# Patient Record
Sex: FEMALE | Race: WHITE | NOT HISPANIC OR LATINO | Employment: FULL TIME | URBAN - METROPOLITAN AREA
[De-identification: names, ages, dates, MRNs, and addresses within clinical notes are randomized per-mention and may not be internally consistent; named-entity substitution may affect disease eponyms.]

---

## 2019-05-30 ENCOUNTER — OFFICE VISIT (OUTPATIENT)
Dept: URGENT CARE | Facility: MEDICAL CENTER | Age: 29
End: 2019-05-30
Payer: COMMERCIAL

## 2019-05-30 VITALS
DIASTOLIC BLOOD PRESSURE: 70 MMHG | SYSTOLIC BLOOD PRESSURE: 110 MMHG | OXYGEN SATURATION: 92 % | WEIGHT: 172 LBS | TEMPERATURE: 98 F | HEIGHT: 68 IN | HEART RATE: 63 BPM | BODY MASS INDEX: 26.07 KG/M2

## 2019-05-30 DIAGNOSIS — N94.6 PAINFUL MENSTRUATION: ICD-10-CM

## 2019-05-30 LAB
APPEARANCE UR: CLEAR
BILIRUB UR STRIP-MCNC: NEGATIVE MG/DL
COLOR UR AUTO: YELLOW
GLUCOSE UR STRIP.AUTO-MCNC: NEGATIVE MG/DL
KETONES UR STRIP.AUTO-MCNC: NEGATIVE MG/DL
LEUKOCYTE ESTERASE UR QL STRIP.AUTO: NEGATIVE
NITRITE UR QL STRIP.AUTO: NEGATIVE
PH UR STRIP.AUTO: 5.5 [PH] (ref 5–8)
PROT UR QL STRIP: NEGATIVE MG/DL
RBC UR QL AUTO: NORMAL
SP GR UR STRIP.AUTO: >=1.03
UROBILINOGEN UR STRIP-MCNC: 0.2 MG/DL

## 2019-05-30 PROCEDURE — 81002 URINALYSIS NONAUTO W/O SCOPE: CPT | Performed by: NURSE PRACTITIONER

## 2019-05-30 PROCEDURE — 99203 OFFICE O/P NEW LOW 30 MIN: CPT | Performed by: NURSE PRACTITIONER

## 2019-05-30 RX ORDER — METRONIDAZOLE 7.5 MG/G
GEL VAGINAL
Refills: 0 | COMMUNITY
Start: 2019-04-16 | End: 2021-07-19

## 2019-05-30 RX ORDER — COPPER 313.4 MG/1
INTRAUTERINE DEVICE INTRAUTERINE
COMMUNITY

## 2019-05-30 NOTE — PROGRESS NOTES
Subjective:      Terrell Vazquez is a 29 y.o. female who presents with Tampon Removal (she thinks there may be one stuck inside her since the weekend)    History reviewed. No pertinent past medical history.  Social History     Social History   • Marital status: Single     Spouse name: N/A   • Number of children: N/A   • Years of education: N/A     Occupational History   • Not on file.     Social History Main Topics   • Smoking status: Never Smoker   • Smokeless tobacco: Never Used   • Alcohol use Yes   • Drug use: No   • Sexual activity: Not on file     Other Topics Concern   • Not on file     Social History Narrative   • No narrative on file     History reviewed. No pertinent family history.    Allergies: Patient has no allergy information on record.    Patient is a 29-year-old female who presents with concern for possible vaginal foreign body.  States that she has a history of being treated for bacterial vaginosis recently with MetroGel vaginal.  States she believes she might have had a tampon in place and did not recall this and inserted the applicator to apply medication.  She thinks this may have pushed her tampon in far enough where she cannot get it out.  Patient states she does feel a sense of pressure, and states that when she tried to insert the applicator last night to apply medication that she felt the applicator did not go and is easily.  Because of that, patient thought she might have a tampon in place obstructing.          Other   This is a new problem. The current episode started in the past 7 days. The problem occurs constantly. The problem has been unchanged. Nothing aggravates the symptoms. She has tried nothing for the symptoms. The treatment provided no relief.       Review of Systems   Genitourinary:        Possible vaginal FB   All other systems reviewed and are negative.         Objective:     There were no vitals taken for this visit.     Physical Exam   Constitutional: She appears  well-developed and well-nourished.   Genitourinary: Vaginal discharge found.   Genitourinary Comments: Speculum exam of the vaginal canal does not reveal foreign body.  Upon opening the speculum, I can see clearly back to the cervix and checked around the cervix as well.  There is an IUD protruding from the cervical loss and appears to be in place.  There is some fresh blood noted in the cervical loss.  No other abnormalities, lacerations, masses, or foreign bodies noted.  Patient was advised as such.   Skin: Skin is warm and dry. Capillary refill takes less than 2 seconds.   Psychiatric: She has a normal mood and affect. Her behavior is normal. Judgment and thought content normal.   Vitals reviewed.    UA: negative leukocytes, negative nitrates, positive blood          Assessment/Plan:   Possible vaginal foreign body-ruled out    Patient reassured  May follow up with GYN if needed.   Follow up otherwise in UC for any further questions or concerns.      There are no diagnoses linked to this encounter.

## 2020-05-08 ENCOUNTER — HOSPITAL ENCOUNTER (OUTPATIENT)
Dept: RADIOLOGY | Facility: MEDICAL CENTER | Age: 30
End: 2020-05-08
Attending: OBSTETRICS & GYNECOLOGY
Payer: COMMERCIAL

## 2020-05-08 DIAGNOSIS — Z32.01 PREGNANCY EXAMINATION OR TEST, POSITIVE RESULT: ICD-10-CM

## 2020-05-08 DIAGNOSIS — O20.0 THREATENED MISCARRIAGE: ICD-10-CM

## 2020-05-08 DIAGNOSIS — N92.6 MISSED PERIODS: ICD-10-CM

## 2020-05-08 DIAGNOSIS — R10.2 PELVIC PAIN: ICD-10-CM

## 2020-05-08 PROCEDURE — 76817 TRANSVAGINAL US OBSTETRIC: CPT

## 2020-05-10 ENCOUNTER — HOSPITAL ENCOUNTER (EMERGENCY)
Facility: MEDICAL CENTER | Age: 30
End: 2020-05-10
Payer: COMMERCIAL

## 2021-04-28 ENCOUNTER — HOSPITAL ENCOUNTER (OUTPATIENT)
Dept: HOSPITAL 8 - RAD | Age: 31
Discharge: HOME | End: 2021-04-28
Attending: FAMILY MEDICINE
Payer: COMMERCIAL

## 2021-04-28 DIAGNOSIS — M79.604: Primary | ICD-10-CM

## 2021-07-19 ENCOUNTER — OFFICE VISIT (OUTPATIENT)
Dept: URGENT CARE | Facility: CLINIC | Age: 31
End: 2021-07-19
Payer: COMMERCIAL

## 2021-07-19 VITALS
WEIGHT: 160 LBS | OXYGEN SATURATION: 97 % | BODY MASS INDEX: 24.25 KG/M2 | HEIGHT: 68 IN | RESPIRATION RATE: 16 BRPM | TEMPERATURE: 98.7 F | SYSTOLIC BLOOD PRESSURE: 104 MMHG | DIASTOLIC BLOOD PRESSURE: 70 MMHG | HEART RATE: 93 BPM

## 2021-07-19 DIAGNOSIS — Z20.822 CLOSE EXPOSURE TO COVID-19 VIRUS: ICD-10-CM

## 2021-07-19 PROCEDURE — 99212 OFFICE O/P EST SF 10 MIN: CPT | Mod: CS | Performed by: FAMILY MEDICINE

## 2021-07-20 NOTE — PROGRESS NOTES
"Subjective:     Terrell Vazquez is a 31 y.o. female who presents for Coronavirus Screening (Exposure @ home )    HPI  Pt presents for evaluation of an acute problem  Patient with COVID-19 exposure at home  Patient currently completely asymptomatic  Wants to discuss being tested    Review of Systems   Constitutional: Negative for fever.   HENT: Negative for sore throat.    Respiratory: Negative for cough.    Gastrointestinal: Negative for vomiting.   Skin: Negative for rash.     PMH:  has no past medical history on file.  MEDS:   Current Outpatient Medications:   •  PARAGARD INTRAUTERINE COPPER IUD, by Intrauterine route., Disp: , Rfl:   ALLERGIES: No Known Allergies  SURGHX: History reviewed. No pertinent surgical history.  SOCHX:  reports that she has never smoked. She has never used smokeless tobacco. She reports current alcohol use. She reports current drug use. Drug: Marijuana.     Objective:   /70   Pulse 93   Temp 37.1 °C (98.7 °F) (Temporal)   Resp 16   Ht 1.727 m (5' 8\")   Wt 72.6 kg (160 lb)   SpO2 97%   Breastfeeding No   BMI 24.33 kg/m²     Physical Exam  Constitutional:       General: She is not in acute distress.     Appearance: She is well-developed. She is not diaphoretic.   Pulmonary:      Effort: Pulmonary effort is normal.   Neurological:      Mental Status: She is alert.       Assessment/Plan:   Assessment    1. Close exposure to COVID-19 virus    Patient initially wanting COVID-19 testing, however decided against it since she is asymptomatic.  Planning to stay home for isolation anyway.  Plans to get tested in a few days instead.    "

## 2021-10-17 ASSESSMENT — ENCOUNTER SYMPTOMS
SORE THROAT: 0
VOMITING: 0
FEVER: 0
COUGH: 0